# Patient Record
Sex: FEMALE | Race: WHITE | NOT HISPANIC OR LATINO
[De-identification: names, ages, dates, MRNs, and addresses within clinical notes are randomized per-mention and may not be internally consistent; named-entity substitution may affect disease eponyms.]

---

## 2021-12-12 ENCOUNTER — NON-APPOINTMENT (OUTPATIENT)
Age: 61
End: 2021-12-12

## 2021-12-14 ENCOUNTER — OUTPATIENT (OUTPATIENT)
Dept: OUTPATIENT SERVICES | Facility: HOSPITAL | Age: 61
LOS: 1 days | End: 2021-12-14
Payer: COMMERCIAL

## 2021-12-14 ENCOUNTER — RESULT REVIEW (OUTPATIENT)
Age: 61
End: 2021-12-14

## 2021-12-14 ENCOUNTER — APPOINTMENT (OUTPATIENT)
Dept: ORTHOPEDIC SURGERY | Facility: CLINIC | Age: 61
End: 2021-12-14
Payer: COMMERCIAL

## 2021-12-14 ENCOUNTER — NON-APPOINTMENT (OUTPATIENT)
Age: 61
End: 2021-12-14

## 2021-12-14 VITALS — WEIGHT: 130 LBS | HEIGHT: 64 IN | OXYGEN SATURATION: 98 % | HEART RATE: 73 BPM | BODY MASS INDEX: 22.2 KG/M2

## 2021-12-14 DIAGNOSIS — Z82.62 FAMILY HISTORY OF OSTEOPOROSIS: ICD-10-CM

## 2021-12-14 DIAGNOSIS — Z78.9 OTHER SPECIFIED HEALTH STATUS: ICD-10-CM

## 2021-12-14 DIAGNOSIS — Z87.09 PERSONAL HISTORY OF OTHER DISEASES OF THE RESPIRATORY SYSTEM: ICD-10-CM

## 2021-12-14 DIAGNOSIS — Z80.0 FAMILY HISTORY OF MALIGNANT NEOPLASM OF DIGESTIVE ORGANS: ICD-10-CM

## 2021-12-14 DIAGNOSIS — Z86.39 PERSONAL HISTORY OF OTHER ENDOCRINE, NUTRITIONAL AND METABOLIC DISEASE: ICD-10-CM

## 2021-12-14 DIAGNOSIS — Z86.69 PERSONAL HISTORY OF OTHER DISEASES OF THE NERVOUS SYSTEM AND SENSE ORGANS: ICD-10-CM

## 2021-12-14 PROBLEM — Z00.00 ENCOUNTER FOR PREVENTIVE HEALTH EXAMINATION: Status: ACTIVE | Noted: 2021-12-14

## 2021-12-14 PROCEDURE — 73564 X-RAY EXAM KNEE 4 OR MORE: CPT | Mod: 26,50

## 2021-12-14 PROCEDURE — 73564 X-RAY EXAM KNEE 4 OR MORE: CPT

## 2021-12-14 PROCEDURE — 99203 OFFICE O/P NEW LOW 30 MIN: CPT

## 2021-12-15 NOTE — PHYSICAL EXAM
[de-identified] : The patient is a well developed, well nourished female in no apparent distress. She is alert and oriented X 3 with a pleasant mood and appropriate affect.\par \par On physical examination of the right knee, there is full range of motion. The patient walks with a normal gait and stands in neutral alignment. There is no effusion. No warmth or erythema is noted. The patella is tender to palpation medially and laterally. There is patellofemoral crepitus noted. The apprehension and grind tests are negative. The extensor mechanism is intact. There is no joint line tenderness. The Gabrielle sign is absent. The Lachman and pivot shift tests are negative. There is no varus or valgus laxity at 0 or 30 degrees. No posterolateral or anteromedial laxity is noted. No masses are palpable. No other soft tissue or bony tenderness is noted. There is some tenderness noted on palpation of the IT band. Quadriceps weakness is noted. Neurovascular function is intact.\par \par On physical examination of the left knee, there is full range of motion. The patient walks with a normal gait and stands in neutral alignment. There is no effusion. No warmth or erythema is noted. The patella is tender to palpation medially and laterally. There is patellofemoral crepitus noted. The apprehension and grind tests are negative. The extensor mechanism is intact. There is no joint line tenderness. The Gabrielle sign is absent. The Lachman and pivot shift tests are negative. There is no varus or valgus laxity at 0 or 30 degrees. No posterolateral or anteromedial laxity is noted. No masses are palpable. No other soft tissue or bony tenderness is noted. There is some tenderness noted on palpation of the IT band. Quadriceps weakness is noted. Neurovascular function is intact. 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

## 2021-12-15 NOTE — DISCUSSION/SUMMARY
[de-identified] : Ms Lord has PF DJD and pain in both knees. She will begin a course of supervised PT. She will avoid step aerobics and will minimize her stair climbing for now. She will increase her activities as tolerated. we will see her back on an as needed basis. She will call if any issues arise

## 2021-12-15 NOTE — END OF VISIT
[FreeTextEntry3] : All medical record entries made by NAM Chisholm, acting as a scribe for this encounter under the direction of Lamberto Hand MD . I have reviewed the chart and agree that the record accurately reflects my personal performance of the history, physical exam, assessment and plan. I have also personally directed, reviewed, and agreed with the chart.

## 2021-12-15 NOTE — HISTORY OF PRESENT ILLNESS
[de-identified] : Melisa Mckenzie is a 62 yo woman who presents with bilateral knee issues for the last few months. She has noted increased anterior right knee pain when seated with her knee flexed for extended periods. SHe also reports anterior left knee pain with stairs. She started doing step aerobics during the pandemic and noted increased pain after exercise. She moved to a house with a lots of steps during the pandemic as well. This has definitely contributed to her knee issues as well. She denies any previous knee issues, SHe denies any locking or buckling. \par \par

## 2021-12-17 PROBLEM — Z82.62 FAMILY HISTORY OF OSTEOPOROSIS: Status: ACTIVE | Noted: 2021-12-14

## 2021-12-17 PROBLEM — Z86.39 HISTORY OF HIGH CHOLESTEROL: Status: RESOLVED | Noted: 2021-12-14 | Resolved: 2021-12-17

## 2021-12-17 PROBLEM — Z78.9 SOCIAL ALCOHOL USE: Status: ACTIVE | Noted: 2021-12-14

## 2021-12-17 PROBLEM — Z78.9 NEVER SMOKED CIGARETTES: Status: ACTIVE | Noted: 2021-12-14

## 2021-12-17 PROBLEM — Z87.09 HISTORY OF HAY FEVER: Status: RESOLVED | Noted: 2021-12-14 | Resolved: 2021-12-17

## 2021-12-17 PROBLEM — Z86.69 HISTORY OF CATARACT: Status: RESOLVED | Noted: 2021-12-14 | Resolved: 2021-12-17

## 2021-12-17 PROBLEM — Z78.9 NON-SMOKER: Status: ACTIVE | Noted: 2021-12-14

## 2021-12-17 PROBLEM — Z80.0 FAMILY HISTORY OF PANCREATIC CANCER: Status: ACTIVE | Noted: 2021-12-14

## 2021-12-17 RX ORDER — ROSUVASTATIN CALCIUM 5 MG/1
TABLET, FILM COATED ORAL
Refills: 0 | Status: ACTIVE | COMMUNITY

## 2021-12-17 RX ORDER — TIMOLOL MALEATE 5 MG/ML
0.5 SOLUTION OPHTHALMIC
Refills: 0 | Status: ACTIVE | COMMUNITY

## 2021-12-17 RX ORDER — BIMATOPROST 0.1 MG/ML
0.01 SOLUTION/ DROPS OPHTHALMIC
Refills: 0 | Status: ACTIVE | COMMUNITY

## 2021-12-17 RX ORDER — DILTIAZEM HCL 120 MG
CAPSULE, EXT RELEASE 24 HR ORAL
Refills: 0 | Status: ACTIVE | COMMUNITY

## 2022-06-15 ENCOUNTER — APPOINTMENT (OUTPATIENT)
Dept: ORTHOPEDIC SURGERY | Facility: CLINIC | Age: 62
End: 2022-06-15
Payer: COMMERCIAL

## 2022-06-15 DIAGNOSIS — M17.10 UNILATERAL PRIMARY OSTEOARTHRITIS, UNSPECIFIED KNEE: ICD-10-CM

## 2022-06-15 DIAGNOSIS — M22.2X2 PATELLOFEMORAL DISORDERS, RIGHT KNEE: ICD-10-CM

## 2022-06-15 DIAGNOSIS — M22.2X1 PATELLOFEMORAL DISORDERS, RIGHT KNEE: ICD-10-CM

## 2022-06-15 PROCEDURE — 99214 OFFICE O/P EST MOD 30 MIN: CPT

## 2022-06-15 RX ORDER — ETODOLAC 400 MG/1
400 TABLET, FILM COATED ORAL TWICE DAILY
Qty: 40 | Refills: 1 | Status: ACTIVE | COMMUNITY
Start: 2021-12-14 | End: 1900-01-01

## 2022-06-15 NOTE — HISTORY OF PRESENT ILLNESS
[de-identified] : Ms. Mckenzie is a 62 year old female who comes in for evaluation for LEFT knee pain that started about a year ago but over the last few days has gotten much worse.\par Pain is 9/10 intermittently but not today.  It can be bad when she is sitting a long time or climbing stairs.  Sometimes just lying in bed at night will lead to severe pain.  She had a Doppler ultrasound yesterday that was negative for DVT.  She never had any injections in her knees.\par She is taking occasional Aleve for pain and heating pads.\par She saw Dr. Hand in Dec 2021 for bilateral knee pain and was diagnosed w/ arthritis and patellofemoral pain.  He referred her to physical therapy but she did not have time.  She was sick with COVID temporarily.  She has better now and would consider going.\par She had tried etodolac

## 2022-06-15 NOTE — ASSESSMENT
[FreeTextEntry1] : 62 year old with left knee pain worse recently.  Her exam is consistent with patellofemoral pain likely from early arthritis in the patellofemoral joint worse on the left than right.\par I recommended that she go to physical therapy and we reviewed home exercises she can do.  She was prescribed the etodolac to take 2/day for about a week and then 1 tablet/day for a week with food.  If she can then just take it as needed.  Her weight is good.\par If she has ongoing pain after doing the therapy and home exercises then we could consider an injection of steroids or hyaluronic acid.\par She can use heat and ice.  If she is sitting in one place for long time she should move her leg around.\par Follow-up if she is not better in 6 to 8 weeks or as needed.